# Patient Record
Sex: FEMALE | Race: WHITE | NOT HISPANIC OR LATINO | URBAN - METROPOLITAN AREA
[De-identification: names, ages, dates, MRNs, and addresses within clinical notes are randomized per-mention and may not be internally consistent; named-entity substitution may affect disease eponyms.]

---

## 2018-09-05 ENCOUNTER — OFFICE VISIT CONVERTED (OUTPATIENT)
Dept: OTHER | Facility: HOSPITAL | Age: 83
End: 2018-09-05
Attending: NURSE PRACTITIONER

## 2018-09-05 ENCOUNTER — CONVERSION ENCOUNTER (OUTPATIENT)
Dept: OTHER | Facility: HOSPITAL | Age: 83
End: 2018-09-05

## 2018-12-03 ENCOUNTER — OFFICE VISIT CONVERTED (OUTPATIENT)
Dept: OTHER | Facility: HOSPITAL | Age: 83
End: 2018-12-03
Attending: NURSE PRACTITIONER

## 2019-03-20 ENCOUNTER — HOSPITAL ENCOUNTER (OUTPATIENT)
Dept: OTHER | Facility: HOSPITAL | Age: 84
Discharge: HOME | End: 2019-03-20
Attending: NURSE PRACTITIONER

## 2019-03-20 ENCOUNTER — CONVERSION ENCOUNTER (OUTPATIENT)
Dept: OTHER | Facility: HOSPITAL | Age: 84
End: 2019-03-20

## 2019-03-20 ENCOUNTER — OFFICE VISIT CONVERTED (OUTPATIENT)
Dept: OTHER | Facility: HOSPITAL | Age: 84
End: 2019-03-20
Attending: NURSE PRACTITIONER

## 2019-03-20 LAB
ALBUMIN SERPL-MCNC: 3.9 G/DL (ref 3.5–5)
ALBUMIN/GLOB SERPL: 1 {RATIO} (ref 1.4–2.6)
ALP SERPL-CCNC: 93 U/L (ref 43–160)
ALT SERPL-CCNC: 18 U/L (ref 10–40)
ANION GAP SERPL CALC-SCNC: 20 MMOL/L (ref 8–19)
AST SERPL-CCNC: 25 U/L (ref 15–50)
BASOPHILS # BLD AUTO: 0.08 10*3/UL (ref 0–0.2)
BASOPHILS NFR BLD AUTO: 0.9 % (ref 0–3)
BILIRUB SERPL-MCNC: 0.4 MG/DL (ref 0.2–1.3)
BUN SERPL-MCNC: 21 MG/DL (ref 5–25)
BUN/CREAT SERPL: 21 {RATIO} (ref 6–20)
CALCIUM SERPL-MCNC: 9.5 MG/DL (ref 8.7–10.4)
CHLORIDE SERPL-SCNC: 97 MMOL/L (ref 99–111)
CHOLEST SERPL-MCNC: 181 MG/DL (ref 107–200)
CHOLEST/HDLC SERPL: 3.6 {RATIO} (ref 3–6)
CONV ABS IMM GRAN: 0.02 10*3/UL (ref 0–0.2)
CONV CO2: 23 MMOL/L (ref 22–32)
CONV IMMATURE GRAN: 0.2 % (ref 0–1.8)
CONV TOTAL PROTEIN: 8 G/DL (ref 6.3–8.2)
CREAT UR-MCNC: 1 MG/DL (ref 0.5–0.9)
DEPRECATED RDW RBC AUTO: 43.2 FL (ref 36.4–46.3)
EOSINOPHIL # BLD AUTO: 0.67 10*3/UL (ref 0–0.7)
EOSINOPHIL # BLD AUTO: 7.9 % (ref 0–7)
ERYTHROCYTE [DISTWIDTH] IN BLOOD BY AUTOMATED COUNT: 13.2 % (ref 11.7–14.4)
GFR SERPLBLD BASED ON 1.73 SQ M-ARVRAT: 53 ML/MIN/{1.73_M2}
GLOBULIN UR ELPH-MCNC: 4.1 G/DL (ref 2–3.5)
GLUCOSE SERPL-MCNC: 85 MG/DL (ref 65–99)
HBA1C MFR BLD: 12.3 G/DL (ref 12–16)
HCT VFR BLD AUTO: 37.8 % (ref 37–47)
HDLC SERPL-MCNC: 50 MG/DL (ref 40–60)
LDLC SERPL CALC-MCNC: 105 MG/DL (ref 70–100)
LYMPHOCYTES # BLD AUTO: 2.83 10*3/UL (ref 1–5)
MCH RBC QN AUTO: 29.1 PG (ref 27–31)
MCHC RBC AUTO-ENTMCNC: 32.5 G/DL (ref 33–37)
MCV RBC AUTO: 89.6 FL (ref 81–99)
MONOCYTES # BLD AUTO: 0.78 10*3/UL (ref 0.2–1.2)
MONOCYTES NFR BLD AUTO: 9.2 % (ref 3–10)
NEUTROPHILS # BLD AUTO: 4.13 10*3/UL (ref 2–8)
NEUTROPHILS NFR BLD AUTO: 48.5 % (ref 30–85)
NRBC CBCN: 0 % (ref 0–0.7)
OSMOLALITY SERPL CALC.SUM OF ELEC: 282 MOSM/KG (ref 273–304)
PLATELET # BLD AUTO: 167 10*3/UL (ref 130–400)
PMV BLD AUTO: ABNORMAL FL (ref 9.4–12.3)
POTASSIUM SERPL-SCNC: 4.8 MMOL/L (ref 3.5–5.3)
RBC # BLD AUTO: 4.22 10*6/UL (ref 4.2–5.4)
SODIUM SERPL-SCNC: 135 MMOL/L (ref 135–147)
TRIGL SERPL-MCNC: 132 MG/DL (ref 40–150)
TSH SERPL-ACNC: 1.49 M[IU]/L (ref 0.27–4.2)
VARIANT LYMPHS NFR BLD MANUAL: 33.3 % (ref 20–45)
VLDLC SERPL-MCNC: 26 MG/DL (ref 5–37)
WBC # BLD AUTO: 8.51 10*3/UL (ref 4.8–10.8)

## 2019-08-29 ENCOUNTER — CONVERSION ENCOUNTER (OUTPATIENT)
Dept: OTHER | Facility: HOSPITAL | Age: 84
End: 2019-08-29

## 2019-08-29 ENCOUNTER — OFFICE VISIT CONVERTED (OUTPATIENT)
Dept: OTHER | Facility: HOSPITAL | Age: 84
End: 2019-08-29
Attending: NURSE PRACTITIONER

## 2019-08-29 ENCOUNTER — HOSPITAL ENCOUNTER (OUTPATIENT)
Dept: OTHER | Facility: HOSPITAL | Age: 84
Discharge: HOME | End: 2019-08-29
Attending: NURSE PRACTITIONER

## 2019-08-29 LAB
ALBUMIN SERPL-MCNC: 4.2 G/DL (ref 3.5–5)
ALBUMIN/GLOB SERPL: 1.1 {RATIO} (ref 1.4–2.6)
ALP SERPL-CCNC: 91 U/L (ref 43–160)
ALT SERPL-CCNC: 20 U/L (ref 10–40)
ANION GAP SERPL CALC-SCNC: 18 MMOL/L (ref 8–19)
AST SERPL-CCNC: 28 U/L (ref 15–50)
BASOPHILS # BLD AUTO: 0.09 10*3/UL (ref 0–0.2)
BASOPHILS NFR BLD AUTO: 1 % (ref 0–3)
BILIRUB SERPL-MCNC: 0.33 MG/DL (ref 0.2–1.3)
BUN SERPL-MCNC: 29 MG/DL (ref 5–25)
BUN/CREAT SERPL: 26 {RATIO} (ref 6–20)
CALCIUM SERPL-MCNC: 9.7 MG/DL (ref 8.7–10.4)
CHLORIDE SERPL-SCNC: 98 MMOL/L (ref 99–111)
CHOLEST SERPL-MCNC: 214 MG/DL (ref 107–200)
CHOLEST/HDLC SERPL: 3.6 {RATIO} (ref 3–6)
CONV ABS IMM GRAN: 0.02 10*3/UL (ref 0–0.2)
CONV CO2: 24 MMOL/L (ref 22–32)
CONV IMMATURE GRAN: 0.2 % (ref 0–1.8)
CONV TOTAL PROTEIN: 8.1 G/DL (ref 6.3–8.2)
CREAT UR-MCNC: 1.1 MG/DL (ref 0.5–0.9)
DEPRECATED RDW RBC AUTO: 43.6 FL (ref 36.4–46.3)
EOSINOPHIL # BLD AUTO: 0.53 10*3/UL (ref 0–0.7)
EOSINOPHIL # BLD AUTO: 5.8 % (ref 0–7)
ERYTHROCYTE [DISTWIDTH] IN BLOOD BY AUTOMATED COUNT: 13.3 % (ref 11.7–14.4)
GFR SERPLBLD BASED ON 1.73 SQ M-ARVRAT: 47 ML/MIN/{1.73_M2}
GLOBULIN UR ELPH-MCNC: 3.9 G/DL (ref 2–3.5)
GLUCOSE SERPL-MCNC: 81 MG/DL (ref 65–99)
HCT VFR BLD AUTO: 40.8 % (ref 37–47)
HDLC SERPL-MCNC: 59 MG/DL (ref 40–60)
HGB BLD-MCNC: 13.2 G/DL (ref 12–16)
LDLC SERPL CALC-MCNC: 132 MG/DL (ref 70–100)
LYMPHOCYTES # BLD AUTO: 3.01 10*3/UL (ref 1–5)
LYMPHOCYTES NFR BLD AUTO: 33 % (ref 20–45)
MCH RBC QN AUTO: 28.9 PG (ref 27–31)
MCHC RBC AUTO-ENTMCNC: 32.4 G/DL (ref 33–37)
MCV RBC AUTO: 89.3 FL (ref 81–99)
MONOCYTES # BLD AUTO: 0.82 10*3/UL (ref 0.2–1.2)
MONOCYTES NFR BLD AUTO: 9 % (ref 3–10)
NEUTROPHILS # BLD AUTO: 4.64 10*3/UL (ref 2–8)
NEUTROPHILS NFR BLD AUTO: 51 % (ref 30–85)
NRBC CBCN: 0 % (ref 0–0.7)
OSMOLALITY SERPL CALC.SUM OF ELEC: 287 MOSM/KG (ref 273–304)
PLATELET # BLD AUTO: 214 10*3/UL (ref 130–400)
PMV BLD AUTO: 12.5 FL (ref 9.4–12.3)
POTASSIUM SERPL-SCNC: 4.1 MMOL/L (ref 3.5–5.3)
RBC # BLD AUTO: 4.57 10*6/UL (ref 4.2–5.4)
SODIUM SERPL-SCNC: 136 MMOL/L (ref 135–147)
TRIGL SERPL-MCNC: 114 MG/DL (ref 40–150)
TSH SERPL-ACNC: 2.01 M[IU]/L (ref 0.27–4.2)
VLDLC SERPL-MCNC: 23 MG/DL (ref 5–37)
WBC # BLD AUTO: 9.11 10*3/UL (ref 4.8–10.8)

## 2020-03-04 ENCOUNTER — OFFICE VISIT CONVERTED (OUTPATIENT)
Dept: OTHER | Facility: HOSPITAL | Age: 85
End: 2020-03-04
Attending: NURSE PRACTITIONER

## 2020-03-04 ENCOUNTER — CONVERSION ENCOUNTER (OUTPATIENT)
Dept: OTHER | Facility: HOSPITAL | Age: 85
End: 2020-03-04

## 2020-03-04 ENCOUNTER — HOSPITAL ENCOUNTER (OUTPATIENT)
Dept: OTHER | Facility: HOSPITAL | Age: 85
Discharge: HOME | End: 2020-03-04
Attending: NURSE PRACTITIONER

## 2020-03-04 LAB
ALBUMIN SERPL-MCNC: 3.9 G/DL (ref 3.5–5)
ALBUMIN/GLOB SERPL: 0.9 {RATIO} (ref 1.4–2.6)
ALP SERPL-CCNC: 97 U/L (ref 43–160)
ALT SERPL-CCNC: 20 U/L (ref 10–40)
ANION GAP SERPL CALC-SCNC: 22 MMOL/L (ref 8–19)
AST SERPL-CCNC: 22 U/L (ref 15–50)
BASOPHILS # BLD AUTO: 0.08 10*3/UL (ref 0–0.2)
BASOPHILS NFR BLD AUTO: 1.1 % (ref 0–3)
BILIRUB SERPL-MCNC: 0.35 MG/DL (ref 0.2–1.3)
BUN SERPL-MCNC: 27 MG/DL (ref 5–25)
BUN/CREAT SERPL: 24 {RATIO} (ref 6–20)
CALCIUM SERPL-MCNC: 9.6 MG/DL (ref 8.7–10.4)
CHLORIDE SERPL-SCNC: 97 MMOL/L (ref 99–111)
CHOLEST SERPL-MCNC: 192 MG/DL (ref 107–200)
CHOLEST/HDLC SERPL: 3.1 {RATIO} (ref 3–6)
CONV ABS IMM GRAN: 0.02 10*3/UL (ref 0–0.2)
CONV CO2: 20 MMOL/L (ref 22–32)
CONV IMMATURE GRAN: 0.3 % (ref 0–1.8)
CONV TOTAL PROTEIN: 8.1 G/DL (ref 6.3–8.2)
CREAT UR-MCNC: 1.12 MG/DL (ref 0.5–0.9)
DEPRECATED RDW RBC AUTO: 45.2 FL (ref 36.4–46.3)
EOSINOPHIL # BLD AUTO: 0.3 10*3/UL (ref 0–0.7)
EOSINOPHIL # BLD AUTO: 4 % (ref 0–7)
ERYTHROCYTE [DISTWIDTH] IN BLOOD BY AUTOMATED COUNT: 14 % (ref 11.7–14.4)
GFR SERPLBLD BASED ON 1.73 SQ M-ARVRAT: 46 ML/MIN/{1.73_M2}
GLOBULIN UR ELPH-MCNC: 4.2 G/DL (ref 2–3.5)
GLUCOSE SERPL-MCNC: 85 MG/DL (ref 65–99)
HCT VFR BLD AUTO: 39.8 % (ref 37–47)
HDLC SERPL-MCNC: 62 MG/DL (ref 40–60)
HGB BLD-MCNC: 12.8 G/DL (ref 12–16)
LDLC SERPL CALC-MCNC: 111 MG/DL (ref 70–100)
LYMPHOCYTES # BLD AUTO: 2.52 10*3/UL (ref 1–5)
LYMPHOCYTES NFR BLD AUTO: 33.2 % (ref 20–45)
MCH RBC QN AUTO: 28.6 PG (ref 27–31)
MCHC RBC AUTO-ENTMCNC: 32.2 G/DL (ref 33–37)
MCV RBC AUTO: 88.8 FL (ref 81–99)
MONOCYTES # BLD AUTO: 0.75 10*3/UL (ref 0.2–1.2)
MONOCYTES NFR BLD AUTO: 9.9 % (ref 3–10)
NEUTROPHILS # BLD AUTO: 3.92 10*3/UL (ref 2–8)
NEUTROPHILS NFR BLD AUTO: 51.5 % (ref 30–85)
NRBC CBCN: 0 % (ref 0–0.7)
OSMOLALITY SERPL CALC.SUM OF ELEC: 284 MOSM/KG (ref 273–304)
PLATELET # BLD AUTO: 237 10*3/UL (ref 130–400)
PMV BLD AUTO: 11.3 FL (ref 9.4–12.3)
POTASSIUM SERPL-SCNC: 4.2 MMOL/L (ref 3.5–5.3)
RBC # BLD AUTO: 4.48 10*6/UL (ref 4.2–5.4)
SODIUM SERPL-SCNC: 135 MMOL/L (ref 135–147)
TRIGL SERPL-MCNC: 93 MG/DL (ref 40–150)
TSH SERPL-ACNC: 1.9 M[IU]/L (ref 0.27–4.2)
VLDLC SERPL-MCNC: 19 MG/DL (ref 5–37)
WBC # BLD AUTO: 7.59 10*3/UL (ref 4.8–10.8)

## 2020-03-31 ENCOUNTER — CONVERSION ENCOUNTER (OUTPATIENT)
Dept: OTHER | Facility: HOSPITAL | Age: 85
End: 2020-03-31

## 2020-03-31 ENCOUNTER — OFFICE VISIT CONVERTED (OUTPATIENT)
Dept: OTHER | Facility: HOSPITAL | Age: 85
End: 2020-03-31
Attending: NURSE PRACTITIONER

## 2020-04-22 ENCOUNTER — TELEPHONE CONVERTED (OUTPATIENT)
Dept: OTHER | Facility: HOSPITAL | Age: 85
End: 2020-04-22
Attending: NURSE PRACTITIONER

## 2020-05-04 ENCOUNTER — TELEPHONE CONVERTED (OUTPATIENT)
Dept: OTHER | Facility: HOSPITAL | Age: 85
End: 2020-05-04
Attending: NURSE PRACTITIONER

## 2020-07-21 ENCOUNTER — HOSPITAL ENCOUNTER (OUTPATIENT)
Dept: OTHER | Facility: HOSPITAL | Age: 85
Discharge: HOME | End: 2020-07-21
Attending: NURSE PRACTITIONER

## 2020-07-21 ENCOUNTER — CONVERSION ENCOUNTER (OUTPATIENT)
Dept: OTHER | Facility: HOSPITAL | Age: 85
End: 2020-07-21

## 2020-07-21 ENCOUNTER — OFFICE VISIT CONVERTED (OUTPATIENT)
Dept: OTHER | Facility: HOSPITAL | Age: 85
End: 2020-07-21
Attending: NURSE PRACTITIONER

## 2020-07-21 LAB
EST. AVERAGE GLUCOSE BLD GHB EST-MCNC: 114 MG/DL
HBA1C MFR BLD: 5.6 % (ref 3.5–5.7)

## 2020-07-22 LAB
ALBUMIN SERPL-MCNC: 4.4 G/DL (ref 3.5–5)
ALBUMIN/GLOB SERPL: 1.1 {RATIO} (ref 1.4–2.6)
ALP SERPL-CCNC: 101 U/L (ref 43–160)
ALT SERPL-CCNC: 25 U/L (ref 10–40)
ANION GAP SERPL CALC-SCNC: 17 MMOL/L (ref 8–19)
AST SERPL-CCNC: 31 U/L (ref 15–50)
BASOPHILS # BLD AUTO: 0.09 10*3/UL (ref 0–0.2)
BASOPHILS NFR BLD AUTO: 1 % (ref 0–3)
BILIRUB SERPL-MCNC: 0.35 MG/DL (ref 0.2–1.3)
BUN SERPL-MCNC: 32 MG/DL (ref 5–25)
BUN/CREAT SERPL: 30 {RATIO} (ref 6–20)
CALCIUM SERPL-MCNC: 9.6 MG/DL (ref 8.7–10.4)
CHLORIDE SERPL-SCNC: 98 MMOL/L (ref 99–111)
CHOLEST SERPL-MCNC: 236 MG/DL (ref 107–200)
CHOLEST/HDLC SERPL: 3.8 {RATIO} (ref 3–6)
CONV ABS IMM GRAN: 0.02 10*3/UL (ref 0–0.2)
CONV CO2: 25 MMOL/L (ref 22–32)
CONV IMMATURE GRAN: 0.2 % (ref 0–1.8)
CONV TOTAL PROTEIN: 8.3 G/DL (ref 6.3–8.2)
CREAT UR-MCNC: 1.08 MG/DL (ref 0.5–0.9)
DEPRECATED RDW RBC AUTO: 43.6 FL (ref 36.4–46.3)
EOSINOPHIL # BLD AUTO: 0.3 10*3/UL (ref 0–0.7)
EOSINOPHIL # BLD AUTO: 3.2 % (ref 0–7)
ERYTHROCYTE [DISTWIDTH] IN BLOOD BY AUTOMATED COUNT: 13.6 % (ref 11.7–14.4)
GFR SERPLBLD BASED ON 1.73 SQ M-ARVRAT: 47 ML/MIN/{1.73_M2}
GLOBULIN UR ELPH-MCNC: 3.9 G/DL (ref 2–3.5)
GLUCOSE SERPL-MCNC: 85 MG/DL (ref 65–99)
HCT VFR BLD AUTO: 40.5 % (ref 37–47)
HDLC SERPL-MCNC: 62 MG/DL (ref 40–60)
HGB BLD-MCNC: 13.2 G/DL (ref 12–16)
LDLC SERPL CALC-MCNC: 141 MG/DL (ref 70–100)
LYMPHOCYTES # BLD AUTO: 2.7 10*3/UL (ref 1–5)
LYMPHOCYTES NFR BLD AUTO: 29.2 % (ref 20–45)
MCH RBC QN AUTO: 28.8 PG (ref 27–31)
MCHC RBC AUTO-ENTMCNC: 32.6 G/DL (ref 33–37)
MCV RBC AUTO: 88.2 FL (ref 81–99)
MONOCYTES # BLD AUTO: 1.09 10*3/UL (ref 0.2–1.2)
MONOCYTES NFR BLD AUTO: 11.8 % (ref 3–10)
NEUTROPHILS # BLD AUTO: 5.05 10*3/UL (ref 2–8)
NEUTROPHILS NFR BLD AUTO: 54.6 % (ref 30–85)
NRBC CBCN: 0 % (ref 0–0.7)
OSMOLALITY SERPL CALC.SUM OF ELEC: 288 MOSM/KG (ref 273–304)
PLATELET # BLD AUTO: 243 10*3/UL (ref 130–400)
PMV BLD AUTO: 11.3 FL (ref 9.4–12.3)
POTASSIUM SERPL-SCNC: 4.1 MMOL/L (ref 3.5–5.3)
RBC # BLD AUTO: 4.59 10*6/UL (ref 4.2–5.4)
SODIUM SERPL-SCNC: 136 MMOL/L (ref 135–147)
TRIGL SERPL-MCNC: 167 MG/DL (ref 40–150)
TSH SERPL-ACNC: 2.4 M[IU]/L (ref 0.27–4.2)
VLDLC SERPL-MCNC: 33 MG/DL (ref 5–37)
WBC # BLD AUTO: 9.25 10*3/UL (ref 4.8–10.8)

## 2020-07-29 ENCOUNTER — CONVERSION ENCOUNTER (OUTPATIENT)
Dept: OTHER | Facility: HOSPITAL | Age: 85
End: 2020-07-29

## 2020-07-29 ENCOUNTER — OFFICE VISIT CONVERTED (OUTPATIENT)
Dept: OTHER | Facility: HOSPITAL | Age: 85
End: 2020-07-29
Attending: NURSE PRACTITIONER

## 2020-08-10 ENCOUNTER — CONVERSION ENCOUNTER (OUTPATIENT)
Dept: OTHER | Facility: HOSPITAL | Age: 85
End: 2020-08-10

## 2020-08-10 ENCOUNTER — OFFICE VISIT CONVERTED (OUTPATIENT)
Dept: OTHER | Facility: HOSPITAL | Age: 85
End: 2020-08-10
Attending: NURSE PRACTITIONER

## 2020-08-10 ENCOUNTER — HOSPITAL ENCOUNTER (OUTPATIENT)
Dept: OTHER | Facility: HOSPITAL | Age: 85
Discharge: HOME | End: 2020-08-10
Attending: NURSE PRACTITIONER

## 2020-08-10 LAB
ANION GAP SERPL CALC-SCNC: 20 MMOL/L (ref 8–19)
BUN SERPL-MCNC: 28 MG/DL (ref 5–25)
BUN/CREAT SERPL: 21 {RATIO} (ref 6–20)
CALCIUM SERPL-MCNC: 9.9 MG/DL (ref 8.7–10.4)
CHLORIDE SERPL-SCNC: 93 MMOL/L (ref 99–111)
CONV CO2: 26 MMOL/L (ref 22–32)
CREAT UR-MCNC: 1.31 MG/DL (ref 0.5–0.9)
GFR SERPLBLD BASED ON 1.73 SQ M-ARVRAT: 38 ML/MIN/{1.73_M2}
GLUCOSE SERPL-MCNC: 86 MG/DL (ref 65–99)
OSMOLALITY SERPL CALC.SUM OF ELEC: 285 MOSM/KG (ref 273–304)
POTASSIUM SERPL-SCNC: 4 MMOL/L (ref 3.5–5.3)
SODIUM SERPL-SCNC: 135 MMOL/L (ref 135–147)

## 2021-05-12 NOTE — PROGRESS NOTES
Progress Note      Patient Name: Kelley Becerril   Patient ID: [id number]   Sex: Female   YOB: 1935    Primary Care Provider: Pat CAO    Visit Date: March 31, 2020    Provider: NASH Leyva   Location: [facility]   Location Address: [address] [place] [address]  [id number]   Location Phone: [phone]          Chief Complaint  · Hospital follow up      History Of Present Illness  Kelley Becerril is a 84 year old /White female who presents for evaluation and treatment of:      Here for hospital follow up. Was admitted to [facility] on 3- due to injury from fall and uncontrolled HTN. Patient states fall was caused from left knee locking up. Patient states that she fell into the wall and hit her head.  Patient has lots of facial bruising and a hematoma noted to her left forehead.  Bruising radiates all the way down to her neck.  Patient states that she put ice on it and stayed home, she ended up going to the ER 2 days later stating that her heart was fluttering and she felt that her blood pressure was elevated. At discharge she was started on amlodipine 5 mg once daily.    Patient was switched from her Dyazide to lisinopril HCTZ at last visit. Complains of dry cough since starting Lisinopril, requesting to go back on old bp med.    Patient also complaining of increased anxiety states that it originally started while she was in the hospital she cannot have any visitors related to COVID 19 crisis.  She is currently on Zoloft 50 mg daily, requesting effective be increased.       Past Medical History  Disease Name Date Onset Notes   Anxiety --  --    Aortic stenosis --  --    CAD (coronary artery disease) --  --    Depression --  --    GERD --  --    HTN (hypertension) --  --    Hyperlipidemia --  --          Past Surgical History  Procedure Name Date Notes   Colonoscopy --  --    EGD --  --    Knee surgery --  --          Medication List  Name Date Started  Instructions   amlodipine 5 mg oral tablet 03/31/2020 take 1 tablet (5 mg) by oral route once daily for 30 days   aspirin 81 mg oral tablet,delayed release (DR/EC)  take 1 tablet (81 mg) by oral route once daily   Celebrex 200 mg oral capsule  take 1 capsule (200 mg) by oral route once daily   Flonase Allergy Relief 50 mcg/actuation nasal spray,suspension 03/20/2019 spray 2 sprays (100 mcg) in each nostril by intranasal route once daily as needed for 30 days   meclizine 25 mg oral tablet 03/04/2020 take 1 tablet by oral route 3 times a day as needed for 30 days   sertraline 100 mg oral tablet 03/31/2020 take 1 tablet (100 mg) by oral route once daily for 30 days   verapamil 240 mg oral tablet extended release 03/04/2020 take 1 tablet (240 mg) by oral route once daily with food for 30 days   Zofran 4 mg oral tablet 03/04/2020 take 1 tablet by oral route every 4 to 6 hours as needed for 30 days For N/V         Allergy List  Allergen Name Date Reaction Notes   cephalexin --  --  --    Codeine Phosphate --  --  --    Codeine Sulfate --  --  --    Crestor --  --  --    dexamethasone --  --  --    Lipitor --  --  --    morphine --  --  --    Neosporin --  --  --    prednisone --  --  --    simvastatin Mar 31 2020 12:00AM joint pain --    Tape Adherent --  --  --    Terramycin --  --  --        Allergies Reconciled  Family Medical History  Disease Name Relative/Age Notes   Hypertension  --    Diabetes mellitus, type II  --          Social History  Finding Status Start/Stop Quantity Notes   Alcohol Never --/-- --  --    Recreational Drug Use Never --/-- --  --    Single --  --/-- --  --    Tobacco Never --/-- --  --          Immunizations  NameDate Admin Mfg Trade Name Lot Number Route Inj VIS Given VIS Publication   Gpsyfyuix08/01/2019 SKB Fluzone Quadrivalent  NE NE 09/01/2019    Comments: date estimated   Rympfaybi96/15/2018 UNK Fluzone Quadrivalent unknown IM [facility] 12/03/2018 08/07/2015   Comments: recieved at [facility]  "  Zheswqari6176/01/2014 NE Not Entered  NE NE 09/01/2014    Comments:    Prevnar 1310/03/2016 NE Not Entered  NE NE 10/03/2016    Comments:          Review of Systems  · Constitutional  o Denies  o : fatigue, weakness, dizziness  · Cardiovascular  o Denies  o : dypnea on exertion, pain in chest  · Respiratory  o Admits  o : cough  o Denies  o : shortness of breath, sputum  · Gastrointestinal  o Denies  o : diarrhea, constipation  · Genitourinary  o Denies  o : dysuria, hematuria  · Integument  o Admits  o : additional integumentary symptoms except as noted in the HPI  · Neurologic  o Admits  o : headache, falls      Vitals  Date Time BP Position Site L\R Cuff Size HR RR TEMP (F) WT  HT  BMI kg/m2 BSA m2 O2 Sat HC       03/31/2020 09:43 /72 Sitting    89 - R 18 96.7 219lbs 6oz 5'  1\" 41.45 2.07 94 %          Physical Examination  · Constitutional  o Appearance  o : well-nourished, well developed, alert, in no acute distress, well-tended appearance  · Head and Face  o Head  o :   § Inspection  § : atraumatic, normocephalic  · Eyes  o Eyes  o : extraocular movements intact, no scleral icterus, no conjunctival injection  · Respiratory  o Respiratory Effort  o : breathing comfortably, symmetric chest rise  o Auscultation of Lungs  o : clear to asculatation bilaterally, no wheezes, rales, or rhonchii  · Cardiovascular  o Heart  o :   § Auscultation of Heart  § : regular rate and rhythm, no murmurs, rubs, or gallops  o Peripheral Vascular System  o :   § Extremities  § : no edema  · Gastrointestinal  o Abdominal Examination  o :   § Abdomen  § : bowel sounds present, non-distended, non-tender  · Skin and Subcutaneous Tissue  o General Inspection  o : moderate sized left face ecchymosis present, no areas of discoloration, skin turgor normal  · Neurologic  o Mental Status Examination  o :   § Orientation  § : grossly oriented to person, place and time  o Gait and Station  o :   § Gait Screening  § : normal " gait  · Psychiatric  o General  o : normal mood and affect          Assessment  · Essential hypertension     401.9/I10  · HTN (hypertension)     401.9/I10  · Anxiety     300.02/F41.1  · Fall     E888.9/W19.XXXA  · Hematoma     924.9/T14.8XXA  · Facial bruising     920/S00.83XA    Problems Reconciled  Plan  · Orders  o ACO-39: Current medications updated and reviewed () - - 03/31/2020  o ACO-14: Influenza immunization was not administered for reasons documented () - - 03/31/2020  o ACO-13: Fall Risk Screening with 2 or more falls in past year or any fall with injury in the past year (1100F) - - 03/31/2020  · Medications  o Benicar 5 mg oral tablet   SIG: take 1 tablet by oral route daily for 30 days   DISP: (30) tablets with 0 refills  Prescribed on 03/31/2020     o hydrochlorothiazide 12.5 mg oral tablet   SIG: take 1 tablet (12.5 mg) by oral route once daily for 30 days   DISP: (30) tablets with 1 refills  Prescribed on 03/31/2020     o amlodipine 5 mg oral tablet   SIG: take 1 tablet (5 mg) by oral route once daily for 30 days   DISP: (30) tablet with 5 refills  Prescribed on 03/31/2020     o sertraline 100 mg oral tablet   SIG: take 1 tablet (100 mg) by oral route once daily for 30 days   DISP: (30) tablets with 1 refills  Adjusted on 03/31/2020     o lisinopril-hydrochlorothiazide 10-12.5 mg oral tablet   SIG: take 1 tablet by oral route once daily for 30 days   DISP: (30) tablets with 1 refills  Discontinued on 03/31/2020     o Medications have been Reconciled  o Transition of Care or Provider Policy  · Instructions  o Take all medications as prescribed/directed.  o Patient was educated/instructed on their diagnosis, treatment and medications prior to discharge from the clinic today.  o Call the office with any concerns or questions.  o At this time we will DC her lisinopril HCTZ and start the patient on Benicar 5 mg once daily and hydrochlorothiazide 12.5 mg once daily along with her amlodipine 5 mg.  Advised patient to continue to monitor her blood pressure and we will see her back in 2 weeks to see how she is feeling.  o We will increase her sertraline to 100 mg once daily to see if this will better control her anxiety.  · Disposition  o Follow Up PRN  o Follow up in 2 weeks            Electronically Signed by: NASH Leyva -Author on March 31, 2020 10:03:31 AM

## 2021-05-12 NOTE — PROGRESS NOTES
Quick Note      Patient Name: Kelley Becerril   Patient ID: [id number]   Sex: Female   YOB: 1935    Primary Care Provider: Pat CAO    Visit Date: April 22, 2020    Provider: NASH Leyva   Location: [facility]   Location Address: [address] [place] [address]  [id number]   Location Phone: [phone]          History Of Present Illness  TELEHEALTH TELEPHONE VISIT  Chief Complaint: Follow up   Kelley Becerril is a 84 year old /White female who is presenting for evaluation via telehealth telephone visit. Verbal consent obtained before beginning visit.   Provider spent 5 minutes with the patient during telehealth visit.   The following staff were present during this visit: Martha Torrez CMA, Pat CAO   Past Medical History/Overview of Patient Symptoms     Patient was seen on March 31 as a hospital follow-up.  She had fallen and had a large hematoma to her left forehead.  Patient states that it is getting better, bruising has much improved but the knot remains.     Her blood pressure at that time was also noted to be elevated and she was started on lisinopril HCTZ but she was complaining of a dry cough, at that time we discontinued the lisinopril and HCTZ and started patient on Benicar 5 mg along with 12.5 mg of hydrochlorothiazide. Has been checking her blood pressure and yesterday it was 134/73, Hr 78, 129/72 HR 84.     Admits that she has had a headache and sinus drainage and she was taking Zyrtec and OTC nasal spray and taking severe sinus and allergy headache and admits that she is better this morning.     She is also noted to have increased anxiety related to the COVID-19 and other personal issues we increased her sertraline to 100 mg once daily. Admits that she has been doing well with change.     Time In: 0904  Time Out: 0909           Assessment  · Allergic rhinitis due to allergen     477.9/J30.9  · HTN  (hypertension)     401.9/I10  · Depression     296.31  · Anxiety     300.02/F41.1    Problems Reconciled  Plan  · Orders  o Physican Telephone evaluation, 5-10 min ([id number]) - - 04/22/2020  · Medications  o Benicar 5 mg oral tablet   SIG: take 1 tablet by oral route daily for 30 days   DISP: (30) tablets with 5 refills  Refilled on 04/22/2020     o hydrochlorothiazide 12.5 mg oral tablet   SIG: take 1 tablet (12.5 mg) by oral route once daily for 30 days   DISP: (30) tablets with 5 refills  Refilled on 04/22/2020     o sertraline 100 mg oral tablet   SIG: take 1 tablet (100 mg) by oral route once daily for 30 days   DISP: (30) tablets with 5 refills  Refilled on 04/22/2020     o Flonase Allergy Relief 50 mcg/actuation nasal spray,suspension   SIG: spray 2 sprays (100 mcg) in each nostril by intranasal route once daily as needed for 30 days   DISP: (1) 9.9 ml aer w/adap with 5 refills  Discontinued on 04/22/2020     o fluticasone propionate 50 mcg/actuation nasal spray,suspension   SIG: Use 2 sprays in each nostril once daily as needed   DISP: (16) Gram with 4 refills  Discontinued on 04/22/2020     · Instructions  o Plan Of Care: Advised patient to continue the Benicar HCTZ for her blood pressure along with the amlodipine. Continue to monitor blood pressure and if she notices that greater than 140/80 please notify the office. Patient voiced understanding.  o Patient is taking medications as prescribed and doing well.   o Call the office with any concerns or questions.  o Discussed Covid-19 precautions including, but not limited to, social distancing, avoid touching your face, and hand washing.   o Advised her to continue with symptomatic treatment of her allergies, if she notices that she does not continue to improve and starts to feel worse to call the office. Patient voiced understanding and all questions answered.  · Disposition  o Follow Up in 3 months            Electronically Signed by: NASH Leyva  -Author on April 22, 2020 10:12:24 AM

## 2021-05-13 NOTE — PROGRESS NOTES
Progress Note      Patient Name: Kelley Becerril   Patient ID: [id number]   Sex: Female   YOB: 1935    Primary Care Provider: Pat CAO    Visit Date: July 29, 2020    Provider: NASH Leyva   Location: [facility]   Location Address: [address] [place] [address]  [id number]   Location Phone: [phone]          Chief Complaint  · Follow up      History Of Present Illness  Kelley Becerril is a 84 year old /White female who presents for evaluation and treatment of:      1 week follow up on Edema and Hypertension. States swelling in lower legs has improved but feels it has moved towards her abdomen area. Patient is having abdominal distension, which she states she noticed this morning. Patient reports not taking lasix and potassium chloride.     Using broom stick to assist with walking, walker in vehicle.       Past Medical History  Disease Name Date Onset Notes   Anxiety --  --    Aortic stenosis --  --    CAD (coronary artery disease) --  --    Depression --  --    GERD --  --    HTN (hypertension) --  --    Hyperlipidemia --  --          Past Surgical History  Procedure Name Date Notes   Colonoscopy --  --    EGD --  --    Knee surgery --  --          Medication List  Name Date Started Instructions   amlodipine 5 mg oral tablet 03/31/2020 take 1 tablet (5 mg) by oral route once daily for 30 days   aspirin 81 mg oral tablet,delayed release (DR/EC)  take 1 tablet (81 mg) by oral route once daily   Celebrex 200 mg oral capsule 05/04/2020 take 1 capsule (200 mg) by oral route once daily for 30 days   furosemide 20 mg oral tablet 07/22/2020 take 1 tablet (20 mg) by oral route once daily for 30 days   meclizine 25 mg oral tablet 03/04/2020 take 1 tablet by oral route 3 times a day as needed for 30 days   potassium chloride 10 mEq oral tablet extended release 07/22/2020 take 1 tablet (10 meq) by oral route once daily with food for 30 days   sertraline 100 mg oral tablet  04/22/2020 take 1 tablet (100 mg) by oral route once daily for 30 days   triamterene-hydrochlorothiazid 37.5-25 mg oral capsule 05/04/2020 take 1 capsule by oral route once daily for 30 days   verapamil 240 mg oral tablet extended release 03/04/2020 take 1 tablet (240 mg) by oral route once daily with food for 30 days   Zofran 4 mg oral tablet 03/04/2020 take 1 tablet by oral route every 4 to 6 hours as needed for 30 days For N/V         Allergy List  Allergen Name Date Reaction Notes   cephalexin --  --  --    Codeine Phosphate --  --  --    Codeine Sulfate --  --  --    Crestor --  --  --    dexamethasone --  --  --    Lipitor --  --  --    morphine --  --  --    Neosporin --  --  --    prednisone --  --  --    simvastatin Mar 31 2020 12:00AM joint pain --    Tape Adherent --  --  --    Terramycin --  --  --          Family Medical History  Disease Name Relative/Age Notes   Hypertension  --    Diabetes Mellitus, Type II  --          Social History  Finding Status Start/Stop Quantity Notes   Alcohol Never --/-- --  --    Recreational Drug Use Never --/-- --  --    Single --  --/-- --  --    Tobacco Never --/-- --  --          Immunizations  NameDate Admin Mfg Trade Name Lot Number Route Inj VIS Given VIS Publication   Crqdiavwg68/01/2019 SKB Fluzone Quadrivalent  NE NE 09/01/2019    Comments: date estimated   Fofqsxwhw23/15/2018 UNK Fluzone Quadrivalent unknown IM [facility] 12/03/2018 08/07/2015   Comments: recieved at [facility]   Mcnobrmck9726/01/2014 NE Not Entered  NE NE 09/01/2014    Comments:    Prevnar 1310/03/2016 NE Not Entered  NE NE 10/03/2016    Comments:          Review of Systems  · Constitutional  o Admits  o : weakness  o Denies  o : sick contacts, fever, chills, fatigue  · Cardiovascular  o Admits  o : dypnea on exertion  o Denies  o : lower extremity edema, pain in chest  · Respiratory  o Admits  o : shortness of breath  · Gastrointestinal  o Denies  o : diarrhea,  "constipation  · Genitourinary  o Denies  o : urgency, frequency      Vitals  Date Time BP Position Site L\R Cuff Size HR RR TEMP (F) WT  HT  BMI kg/m2 BSA m2 O2 Sat HC       07/29/2020 12:23 /80 Sitting    74 - R 18 97.9 224lbs 6oz 5'  1\" 42.39 2.09 95 %    07/29/2020 01:33 /78 Sitting                     Physical Examination  · Constitutional  o Appearance  o : well-nourished, well developed, alert, in no acute distress, well-tended appearance  · Head and Face  o Head  o :   § Inspection  § : atraumatic, normocephalic  · Eyes  o Eyes  o : extraocular movements intact, no scleral icterus, no conjunctival injection  · Respiratory  o Respiratory Effort  o : breathing comfortably, symmetric chest rise  o Auscultation of Lungs  o : diminished to asculatation bilaterally, no wheezes, rales, or rhonchii  · Cardiovascular  o Heart  o :   § Auscultation of Heart  § : regular rate and rhythm, no murmurs, rubs, or gallops  o Peripheral Vascular System  o :   § Extremities  § : 2+ edema present-lower extremities ankles, no cyanosis, no distal hair loss, normal capillary refill  · Gastrointestinal  o Abdominal Examination  o :   § Abdomen  § : bowel sounds present, distended, non-tender  · Skin and Subcutaneous Tissue  o General Inspection  o : no lesions present, no areas of discoloration, skin turgor normal  · Neurologic  o Mental Status Examination  o :   § Orientation  § : grossly oriented to person, place and time  o Gait and Station  o :   § Gait Screening  § : normal gait  · Psychiatric  o General  o : normal mood and affect          Assessment  · HTN (hypertension)     401.9/I10    Problems Reconciled  Plan  · Orders  o ACO-39: Current medications updated and reviewed () - - 07/29/2020  · Medications  o Medications have been Reconciled  o Transition of Care or Provider Policy  · Instructions  o Take all medications as prescribed/directed.  o Patient was educated/instructed on their diagnosis, treatment " and medications prior to discharge from the clinic today.  o Call the office with any concerns or questions.  o At this time I encouraged the patient to  prescription for furosemide and potassium to start this we will see her back in 1 week to evaluate how her swelling and blood pressure are at that time. Patient voiced understanding. Also called the pharmacy to verify that they had not been to put it in with her delivery tomorrow.  o Educated patient how to check her blood pressure, voiced understanding, encouraged her to keep a log of readings of present back with us at next visit.  · Disposition  o Follow up in 1 weeks     I will see Kelley back in 1 week to reevaluate fluid status and hypertension.    EMR dragon/transcription disclaimer: Much of this encounter note is an electronic transcription/translation of spoken language to printed text.  Electronic translation of spoken language may permit erroneous, or at times nonsensical words or phrases to be inadvertently transcribed; although I have reviewed the note for such errors, some may still exist.             Electronically Signed by: NASH Leyva -Author on July 29, 2020 12:36:26 PM

## 2021-05-13 NOTE — PROGRESS NOTES
Progress Note      Patient Name: Kelley Becerril   Patient ID: [id number]   Sex: Female   YOB: 1935    Primary Care Provider: Pat CAO   Referring Provider: Pat CAO    Visit Date: August 10, 2020    Provider: NASH Leyva   Location: [facility]   Location Address: [address] [place] [address]  [id number]   Location Phone: [phone]          Chief Complaint  · 2 week follow up      History Of Present Illness  Kelley Becerril is a 85 year old /White female who presents for evaluation and treatment of:      2 week follow up on Hypertension. Has brought in blood pressure log. She was started on Furosemide and Potassium at last visit for edema. States that overall she does feel better.       Past Medical History  Disease Name Date Onset Notes   Anxiety --  --    Aortic stenosis --  --    CAD (coronary artery disease) --  --    Depression --  --    GERD --  --    HTN (hypertension) --  --    Hyperlipidemia --  --          Past Surgical History  Procedure Name Date Notes   Colonoscopy --  --    EGD --  --    Knee surgery --  --          Medication List  Name Date Started Instructions   amlodipine 5 mg oral tablet 03/31/2020 take 1 tablet (5 mg) by oral route once daily for 30 days   aspirin 81 mg oral tablet,delayed release (DR/EC)  take 1 tablet (81 mg) by oral route once daily   Celebrex 200 mg oral capsule 05/04/2020 take 1 capsule (200 mg) by oral route once daily for 30 days   furosemide 20 mg oral tablet 08/10/2020 take 1 tablet (20 mg) by oral route once daily for 30 days   meclizine 25 mg oral tablet 03/04/2020 take 1 tablet by oral route 3 times a day as needed for 30 days   potassium chloride 10 mEq oral tablet extended release 08/10/2020 take 1 tablet (10 meq) by oral route once daily with food for 30 days   sertraline 100 mg oral tablet 04/22/2020 take 1 tablet (100 mg) by oral route once daily for 30 days   triamterene-hydrochlorothiazid  37.5-25 mg oral capsule 05/04/2020 take 1 capsule by oral route once daily for 30 days   verapamil 240 mg oral tablet extended release 03/04/2020 take 1 tablet (240 mg) by oral route once daily with food for 30 days   Zofran 4 mg oral tablet 03/04/2020 take 1 tablet by oral route every 4 to 6 hours as needed for 30 days For N/V         Allergy List  Allergen Name Date Reaction Notes   cephalexin --  --  --    Codeine Phosphate --  --  --    Codeine Sulfate --  --  --    Crestor --  --  --    dexamethasone --  --  --    Lipitor --  --  --    morphine --  --  --    Neosporin --  --  --    prednisone --  --  --    simvastatin Mar 31 2020 12:00AM joint pain --    Tape Adherent --  --  --    Terramycin --  --  --        Allergies Reconciled  Family Medical History  Disease Name Relative/Age Notes   Hypertension  --    Diabetes Mellitus, Type II  --          Social History  Finding Status Start/Stop Quantity Notes   Alcohol Never --/-- --  --    Recreational Drug Use Never --/-- --  --    Single --  --/-- --  --    Tobacco Never --/-- --  --          Immunizations  NameDate Admin Mfg Trade Name Lot Number Route Inj VIS Given VIS Publication   Yvthpaodh05/01/2019 SKB Fluzone Quadrivalent  NE NE 09/01/2019    Comments: date estimated   Qmwzstnts99/15/2018 UNK Fluzone Quadrivalent unknown IM UKN 12/03/2018 08/07/2015   Comments: recieved at [facility]   Dwwzkhuzq3318/01/2014 NE Not Entered  NE NE 09/01/2014    Comments:    Prevnar 1310/03/2016 NE Not Entered  NE NE 10/03/2016    Comments:          Review of Systems  · Constitutional  o Denies  o : sick contacts, fever, chills, fatigue, weakness  · Cardiovascular  o Denies  o : dypnea on exertion, lower extremity edema, pain in chest  · Respiratory  o Denies  o : shortness of breath, cough  · Gastrointestinal  o Denies  o : diarrhea, constipation  · Genitourinary  o Denies  o : urgency, frequency      Vitals  Date Time BP Position Site L\R Cuff Size HR RR TEMP (F) WT  HT  BMI  "kg/m2 BSA m2 O2 Sat        08/10/2020 06:12 /82 Sitting    77 - R 16 97.7 226lbs 4oz 5'  1\" 42.75 2.1 93 %          Physical Examination  · Constitutional  o Appearance  o : well-nourished, well developed, alert, in no acute distress, well-tended appearance  · Head and Face  o Head  o :   § Inspection  § : atraumatic, normocephalic  · Eyes  o Eyes  o : extraocular movements intact, no scleral icterus, no conjunctival injection  · Respiratory  o Respiratory Effort  o : breathing comfortably, symmetric chest rise  o Auscultation of Lungs  o : clear to asculatation bilaterally, no wheezes, rales, or rhonchii  · Cardiovascular  o Heart  o :   § Auscultation of Heart  § : regular rate and rhythm, no murmurs, rubs, or gallops  o Peripheral Vascular System  o :   § Extremities  § : mild lower extremity edema present, no cyanosis, no distal hair loss, normal capillary refill  · Gastrointestinal  o Abdominal Examination  o :   § Abdomen  § : bowel sounds present, non-distended, non-tender  · Skin and Subcutaneous Tissue  o General Inspection  o : no lesions present, no areas of discoloration, skin turgor normal  · Neurologic  o Mental Status Examination  o :   § Orientation  § : grossly oriented to person, place and time  o Gait and Station  o :   § Gait Screening  § : normal gait  · Psychiatric  o General  o : normal mood and affect          Assessment  · HTN (hypertension)     401.9/I10  · Edema     782.3/R60.9    Problems Reconciled  Plan  · Orders  o BMP [facility] ([id number]) - 401.9/I10 - 08/10/2020  o ACO-39: Current medications updated and reviewed () - - 08/10/2020  · Medications  o furosemide 20 mg oral tablet   SIG: take 1 tablet (20 mg) by oral route once daily for 30 days   DISP: (30) tablets with 2 refills  Refilled on 08/10/2020     o potassium chloride 10 mEq oral tablet extended release   SIG: take 1 tablet (10 meq) by oral route once daily with food for 30 days   DISP: (30) tablets with 2 " refills  Refilled on 08/10/2020     o Medications have been Reconciled  o Transition of Care or Provider Policy  · Instructions  o Patient is taking medications as prescribed and doing well.   o Patient was educated/instructed on their diagnosis, treatment and medications prior to discharge from the clinic today.  o Call the office with any concerns or questions.  o Will continue Furosemide and potassium as prescribed, we will repeat a BMP today.  · Disposition  o Follow Up in 1 month     I will see Kelley back in 1 month patient.    EMR dragon/transcription disclaimer: Much of this encounter note is an electronic transcription/translation of spoken language to printed text.  Electronic translation of spoken language may permit erroneous, or at times nonsensical words or phrases to be inadvertently transcribed; although I have reviewed the note for such errors, some may still exist.             Electronically Signed by: NASH Leyva -Author on August 11, 2020 09:28:49 PM

## 2021-05-13 NOTE — PROGRESS NOTES
Progress Note      Patient Name: Kelley Becerril   Patient ID: [id number]   Sex: Female   YOB: 1935    Primary Care Provider: Pat CAO    Visit Date: July 21, 2020    Provider: NASH Leyva   Location: [facility]   Location Address: [address] [place] [address]  [id number]   Location Phone: [phone]          Chief Complaint  · Follow up  · leg/foot swelling      History Of Present Illness  Kelley Becerril is a 84 year old /White female who presents for evaluation and treatment of:      Follow up on Hypertension and Anxiety. Is currently on amlodipine, verapamil, and triamterene hydrochlorothiazide for hypertension blood pressures under great control today.  She is on sertraline for depression and anxiety symptoms are better controlled.  She is using Celebrex for osteoarthritis, last creatinine was 1.12 with a GFR 46. Patient states that her blood sugar has been elevated over the last few months.     Complains of bilateral foot/leg swelling intermittent since last office visit in March , trouble wearing shoes.  States blood pressure and heartrate have been fluctuating.     Taking stool softener daily for constipation  Wearing depends for incontinence.       Past Medical History  Disease Name Date Onset Notes   Anxiety --  --    Aortic stenosis --  --    CAD (coronary artery disease) --  --    Depression --  --    GERD --  --    HTN (hypertension) --  --    Hyperlipidemia --  --          Past Surgical History  Procedure Name Date Notes   Colonoscopy --  --    EGD --  --    Knee surgery --  --          Medication List  Name Date Started Instructions   amlodipine 5 mg oral tablet 03/31/2020 take 1 tablet (5 mg) by oral route once daily for 30 days   aspirin 81 mg oral tablet,delayed release (DR/EC)  take 1 tablet (81 mg) by oral route once daily   Celebrex 200 mg oral capsule 05/04/2020 take 1 capsule (200 mg) by oral route once daily for 30 days   meclizine 25 mg  oral tablet 03/04/2020 take 1 tablet by oral route 3 times a day as needed for 30 days   sertraline 100 mg oral tablet 04/22/2020 take 1 tablet (100 mg) by oral route once daily for 30 days   triamterene-hydrochlorothiazid 37.5-25 mg oral capsule 05/04/2020 take 1 capsule by oral route once daily for 30 days   verapamil 240 mg oral tablet extended release 03/04/2020 take 1 tablet (240 mg) by oral route once daily with food for 30 days   Zofran 4 mg oral tablet 03/04/2020 take 1 tablet by oral route every 4 to 6 hours as needed for 30 days For N/V         Allergy List  Allergen Name Date Reaction Notes   cephalexin --  --  --    Codeine Phosphate --  --  --    Codeine Sulfate --  --  --    Crestor --  --  --    dexamethasone --  --  --    Lipitor --  --  --    morphine --  --  --    Neosporin --  --  --    prednisone --  --  --    simvastatin Mar 31 2020 12:00AM joint pain --    Tape Adherent --  --  --    Terramycin --  --  --        Allergies Reconciled  Family Medical History  Disease Name Relative/Age Notes   Hypertension  --    Diabetes Mellitus, Type II  --          Social History  Finding Status Start/Stop Quantity Notes   Alcohol Never --/-- --  --    Recreational Drug Use Never --/-- --  --    Single --  --/-- --  --    Tobacco Never --/-- --  --          Immunizations  NameDate Admin Mfg Trade Name Lot Number Route Inj VIS Given VIS Publication   Thqbvunrl61/01/2019 SKB Fluzone Quadrivalent  NE NE 09/01/2019    Comments: date estimated   Oatpdhidg98/15/2018 UNK Fluzone Quadrivalent unknown IM UKN 12/03/2018 08/07/2015   Comments: recieved at [facility]   Fbgcdvjhz5231/01/2014 NE Not Entered  NE NE 09/01/2014    Comments:    Prevnar 1310/03/2016 NE Not Entered  NE NE 10/03/2016    Comments:          Review of Systems  · Constitutional  o Admits  o : weakness  o Denies  o : sick contacts, fever, chills, fatigue  · Cardiovascular  o Admits  o : lower extremity edema  o Denies  o : dypnea on exertion, pain in  "chest  · Respiratory  o Admits  o : shortness of breath  o Denies  o : cough  · Gastrointestinal  o Denies  o : diarrhea, constipation  · Genitourinary  o Denies  o : urgency, frequency, dysuria  · Musculoskeletal  o Admits  o : joint pain, limitation of motion, knee pain      Vitals  Date Time BP Position Site L\R Cuff Size HR RR TEMP (F) WT  HT  BMI kg/m2 BSA m2 O2 Sat        07/21/2020 10:58 /80 Sitting    91 - R 16 97.7 221lbs 2oz 5'  1\" 41.78 2.08 94 %          Physical Examination  · Constitutional  o Appearance  o : well-nourished, well developed, alert, in no acute distress, well-tended appearance  · Head and Face  o Head  o :   § Inspection  § : atraumatic, normocephalic  · Eyes  o Eyes  o : extraocular movements intact, no scleral icterus, no conjunctival injection  · Respiratory  o Respiratory Effort  o : breathing comfortably, symmetric chest rise  o Auscultation of Lungs  o : clear to asculatation bilaterally, no wheezes, rales, or rhonchii  · Cardiovascular  o Heart  o :   § Auscultation of Heart  § : regular rate and rhythm, no murmurs, rubs, or gallops  o Peripheral Vascular System  o :   § Extremities  § : 1+ edema present-lower extremities, no cyanosis, no distal hair loss, normal capillary refill  · Gastrointestinal  o Abdominal Examination  o :   § Abdomen  § : bowel sounds present, non-distended, non-tender  · Skin and Subcutaneous Tissue  o General Inspection  o : no lesions present, no areas of discoloration, skin turgor normal  · Neurologic  o Mental Status Examination  o :   § Orientation  § : grossly oriented to person, place and time  o Gait and Station  o :   § Gait Screening  § : Ambulates with cane  · Psychiatric  o General  o : normal mood and affect          Assessment  · Essential hypertension     401.9/I10  · Hyperlipidemia     272.4/E78.5  · CAD (coronary artery disease)     414.00/I25.10  · Aortic " stenosis     747.22/Q25.3  · Depression     296.31  · Anxiety     300.02/F41.1  · Elevated blood sugar     790.29/R73.9  · Edema     782.3/R60.9    Problems Reconciled  Plan  · Orders  o Hgb A1c [facility] ([id number]) - 790.29/R73.9 - 07/21/2020  o Physical, Primary Care Panel (CBC, CMP, Lipid, TSH) [facility] ([id number], [id number], [id number], [id number]) - 401.9/I10, 272.4/E78.5 - 07/21/2020  o ACO-39: Current medications updated and reviewed () - - 07/21/2020  · Medications  o furosemide 20 mg oral tablet   SIG: take 1 tablet (20 mg) by oral route once daily for 30 days   DISP: (30) tablets with 0 refills  Prescribed on 07/21/2020     o potassium chloride 10 mEq oral tablet extended release   SIG: take 1 tablet (10 meq) by oral route once daily with food for 30 days   DISP: (30) tablets with 0 refills  Prescribed on 07/21/2020     o Medications have been Reconciled  o Transition of Care or Provider Policy  · Instructions  o Patient advised to monitor blood pressure (B/P) at home and journal readings. Patient informed that a B/P reading at home of more than 130/80 is considered hypertension. For readings greater tqhr250/90 or higher patient is advised to follow up in the office with readings for management. Patient advised to limit sodium intake.  o Patient is taking medications as prescribed and doing well.   o Take all medications as prescribed/directed.  o Patient was educated/instructed on their diagnosis, treatment and medications prior to discharge from the clinic today.  o Call the office with any concerns or questions.  o Chronic conditions reviewed and taken into consideration for today's treatment plan.  o Discussed Covid-19 precautions including, but not limited to, social distancing, avoid touching your face, and hand washing.   o In light of patient's edema we will start her on Lasix 20 mg once daily along with potassium 10 mEq once daily advised the patient to take first thing in the morning together and we will see her back in 1  week to reevaluate and to repeat a BMP.  · Disposition  o Follow up in 1 weeks     See Kelley back in 1 week to reevaluate edema and HTN.    EMR dragon/transcription disclaimer: Much of this encounter note is an electronic transcription/translation of spoken language to printed text.  Electronic translation of spoken language may permit erroneous, or at times nonsensical words or phrases to be inadvertently transcribed; although I have reviewed the note for such errors, some may still exist.             Electronically Signed by: NASH Leyva -Author on July 21, 2020 10:49:43 AM

## 2021-05-13 NOTE — PROGRESS NOTES
Quick Note      Patient Name: Kelley Becerril   Patient ID: [id number]   Sex: Female   YOB: 1935    Primary Care Provider: Pat CAO    Visit Date: May 4, 2020    Provider: NASH Leyva   Location: [facility]   Location Address: [address] [place] [address]  [id number]   Location Phone: [phone]          History Of Present Illness  TELEHEALTH TELEPHONE VISIT  Chief Complaint: Swelling/BP meds   Kelley Becerril is a 84 year old /White female who is presenting for evaluation via telehealth telephone visit. Verbal consent obtained before beginning visit.   Provider spent 5 minutes with the patient during telehealth visit.   The following staff were present during this visit: NASH Williamson and Kerri Mckeon CMA   Past Medical History/Overview of Patient Symptoms     *Patient consented to consult via telephone    States she has stopped taking Benicar and HCTZ because her legs/feet started swelling when she started those meds. Has restarted Triamterene HCTZ and swelling has improved. BP this /71. Denies any SOA/ Chest Pain. Needs refill.     Dr. Glover prescribed Celebrex, requesting refill. Creatinine was 1.09 with a GFR of 51.08. Admits that this has helped her knees and hips. States that she tried to stop this when her swelling got bad but did not have any improvement in her swelling and her knee was worse.     Time In: 1429  Time Out: 1434           Assessment  · Osteoarthritis     715.90/M19.90  · HTN (hypertension)     401.9/I10    Problems Reconciled  Plan  · Orders  o Physican Telephone evaluation, 5-10 min ([id number]) - - 05/04/2020  · Medications  o triamterene-hydrochlorothiazid 37.5-25 mg oral capsule   SIG: take 1 capsule by oral route once daily for 30 days   DISP: (30) capsules with 5 refills  Prescribed on 05/04/2020     o Celebrex 200 mg oral capsule   SIG: take 1 capsule (200 mg) by oral route once daily for 30 days   DISP: (30) capsule with 5  refills  Prescribed on 05/04/2020     o Benicar 5 mg oral tablet   SIG: take 1 tablet by oral route daily for 30 days   DISP: (30) tablets with 5 refills  Discontinued on 05/04/2020     o hydrochlorothiazide 12.5 mg oral tablet   SIG: take 1 tablet (12.5 mg) by oral route once daily for 30 days   DISP: (30) tablets with 5 refills  Discontinued on 05/04/2020     o Medications have been Reconciled  o Transition of Care or Provider Policy  · Instructions  o Plan Of Care: At this time we will continue the triamterene HCTZ, and we will discontinue her Benicar HCT. We will repeat her kidney function at next visit which is scheduled in July. Patient voiced understanding and all questions answered.  o Patient is taking medications as prescribed and doing well.   o Call the office with any concerns or questions.  o Discussed Covid-19 precautions including, but not limited to, social distancing, avoid touching your face, and hand washing.      We will see patient back on her routine follow-up scheduled for July 21.             Electronically Signed by: NASH Leyva -Author on May 4, 2020 03:36:40 PM

## 2021-05-15 VITALS
RESPIRATION RATE: 16 BRPM | HEIGHT: 61 IN | BODY MASS INDEX: 42.72 KG/M2 | DIASTOLIC BLOOD PRESSURE: 82 MMHG | TEMPERATURE: 97.7 F | SYSTOLIC BLOOD PRESSURE: 132 MMHG | WEIGHT: 226.25 LBS | HEART RATE: 77 BPM | OXYGEN SATURATION: 93 %

## 2021-05-15 VITALS
HEIGHT: 61 IN | DIASTOLIC BLOOD PRESSURE: 78 MMHG | SYSTOLIC BLOOD PRESSURE: 150 MMHG | TEMPERATURE: 97 F | SYSTOLIC BLOOD PRESSURE: 146 MMHG | WEIGHT: 216.37 LBS | RESPIRATION RATE: 18 BRPM | OXYGEN SATURATION: 93 % | BODY MASS INDEX: 40.85 KG/M2 | HEART RATE: 81 BPM | DIASTOLIC BLOOD PRESSURE: 70 MMHG

## 2021-05-15 VITALS
HEIGHT: 61 IN | BODY MASS INDEX: 41.91 KG/M2 | OXYGEN SATURATION: 93 % | DIASTOLIC BLOOD PRESSURE: 68 MMHG | TEMPERATURE: 97.5 F | SYSTOLIC BLOOD PRESSURE: 118 MMHG | WEIGHT: 222 LBS | HEART RATE: 68 BPM

## 2021-05-15 VITALS
RESPIRATION RATE: 16 BRPM | OXYGEN SATURATION: 94 % | TEMPERATURE: 97.7 F | SYSTOLIC BLOOD PRESSURE: 108 MMHG | WEIGHT: 221.12 LBS | DIASTOLIC BLOOD PRESSURE: 80 MMHG | BODY MASS INDEX: 41.75 KG/M2 | HEIGHT: 61 IN | HEART RATE: 91 BPM

## 2021-05-15 VITALS
BODY MASS INDEX: 42.36 KG/M2 | TEMPERATURE: 97.9 F | OXYGEN SATURATION: 95 % | RESPIRATION RATE: 18 BRPM | DIASTOLIC BLOOD PRESSURE: 78 MMHG | SYSTOLIC BLOOD PRESSURE: 150 MMHG | SYSTOLIC BLOOD PRESSURE: 146 MMHG | DIASTOLIC BLOOD PRESSURE: 80 MMHG | HEART RATE: 74 BPM | WEIGHT: 224.37 LBS | HEIGHT: 61 IN

## 2021-05-15 VITALS
HEART RATE: 89 BPM | HEIGHT: 61 IN | DIASTOLIC BLOOD PRESSURE: 72 MMHG | TEMPERATURE: 96.7 F | WEIGHT: 219.37 LBS | SYSTOLIC BLOOD PRESSURE: 128 MMHG | BODY MASS INDEX: 41.42 KG/M2 | OXYGEN SATURATION: 94 % | RESPIRATION RATE: 18 BRPM

## 2021-05-15 VITALS
HEART RATE: 87 BPM | TEMPERATURE: 97.6 F | WEIGHT: 218 LBS | BODY MASS INDEX: 41.16 KG/M2 | SYSTOLIC BLOOD PRESSURE: 130 MMHG | HEIGHT: 61 IN | DIASTOLIC BLOOD PRESSURE: 72 MMHG | OXYGEN SATURATION: 92 % | RESPIRATION RATE: 18 BRPM

## 2021-05-16 VITALS
HEIGHT: 61 IN | TEMPERATURE: 97.4 F | OXYGEN SATURATION: 94 % | WEIGHT: 221 LBS | DIASTOLIC BLOOD PRESSURE: 82 MMHG | HEART RATE: 86 BPM | SYSTOLIC BLOOD PRESSURE: 142 MMHG | BODY MASS INDEX: 41.72 KG/M2 | RESPIRATION RATE: 18 BRPM

## 2021-05-16 VITALS
SYSTOLIC BLOOD PRESSURE: 124 MMHG | DIASTOLIC BLOOD PRESSURE: 82 MMHG | BODY MASS INDEX: 41.91 KG/M2 | WEIGHT: 222 LBS | RESPIRATION RATE: 16 BRPM | OXYGEN SATURATION: 96 % | HEART RATE: 88 BPM | HEIGHT: 61 IN | TEMPERATURE: 96.7 F